# Patient Record
Sex: MALE | Race: WHITE | NOT HISPANIC OR LATINO | URBAN - METROPOLITAN AREA
[De-identification: names, ages, dates, MRNs, and addresses within clinical notes are randomized per-mention and may not be internally consistent; named-entity substitution may affect disease eponyms.]

---

## 2019-03-18 ENCOUNTER — HISTORICAL (OUTPATIENT)
Dept: ADMINISTRATIVE | Facility: HOSPITAL | Age: 29
End: 2019-03-18

## 2019-03-20 LAB — FINAL CULTURE: NO GROWTH

## 2022-04-07 ENCOUNTER — HISTORICAL (OUTPATIENT)
Dept: ADMINISTRATIVE | Facility: HOSPITAL | Age: 32
End: 2022-04-07

## 2022-04-24 VITALS
WEIGHT: 170 LBS | DIASTOLIC BLOOD PRESSURE: 71 MMHG | SYSTOLIC BLOOD PRESSURE: 105 MMHG | BODY MASS INDEX: 23.8 KG/M2 | HEIGHT: 71 IN

## 2022-05-02 NOTE — HISTORICAL [facility] CERNER
This is a historical note converted from [employer]. Formatting and pictures may have been removed.  Please reference [employer] for original formatting and attached multimedia. Chief Complaint  Patient is c/o groin pain on the left side x1 week  History of Present Illness  Pt is a 28-year-old male, here today for?left groin pain x 1 week. Pt denies any recent trauma or heavy lifting. States he has had dysuria today and has had it off and on. ?Patient states?that pain is 4/10, states that started in his testicles and travel up his groin, states that sometimes he has pain?in his side.? Denies fever chills, body aches, flank pain, STDs. ?Patient states that he has been monogamous, with the same partner for years.??Patient states that he has never had STD testing, declined STD testing today.  Review of Systems  Constitutional: negative except as stated in HPI  Eye: negative except as stated in HPI  ENT: negative except as stated in HPI  Respiratory: negative except as stated in HPI  Cardiovascular: negative except as stated in HPI  Gastrointestinal: negative except as stated in HPI  Genitourinary: negative except as stated in HPI  Hema/Lymph: negative except as stated in HPI  Endocrine: negative except as stated in HPI  Immunologic: negative except as stated in HPI  Musculoskeletal: negative except as stated in HPI  Integumentary: negative except as stated in HPI  Neurologic: negative except as stated in HPI  All Other ROS_ negative except as stated in HPI  ?  ?  Physical Exam  Vitals & Measurements  T:?36.7? ?C (Oral)? HR:?73(Peripheral)? BP:?105/71?  HT:?180?cm? WT:?77.11?kg? BMI:?23.8?  General: Alert and oriented, No acute distress.  Eye:? ?Extraocular movements are intact.  HENT: Normocephalic.?  Respiratory: Lungs are clear to auscultation, Respirations are non-labored, Breath sounds are equal, Symmetrical chest wall expansion.  Cardiovascular: Normal rate, Regular rhythm, No murmur.  Gastrointestinal: Soft, Non-tender,  Non-distended, Normal bowel sounds.  Genitourinary: No flank pain. ?No testicular swelling, erythema noted.? No hernia palpated  Musculoskeletal: Normal range of motion.  Integumentary: Warm, Dry, Intact.  Neurologic: No focal deficits, Cranial Nerves II-XII are grossly intact.  ?  Assessment/Plan  1.?Dysuria?R30.0  POC UA + blood. Will send to lab for UA C&S. ?Maintain adequate hydration. ?Medication as ordered. If no improvement in symptoms in 2-3 days, rtc. ER precautions.  Ordered:  ciprofloxacin, 500 mg = 1 tab(s), Oral, q12hr, X 7 day(s), # 14 tab(s), 0 Refill(s), Pharmacy: [facility] [id number]  After Hrs Visit [id number] PC, Dysuria  UTI symptoms  Groin pain, 03/18/19 17:52:00 CDT  Office/Outpatient Visit Level 3 New [id number] PC, Dysuria  UTI symptoms  Groin pain, 03/18/19 17:52:00 CDT  Urinalysis with Microscopic if Indicated, Stat collect, Urine, 03/18/19 17:52:00 CDT, Stop date 03/18/19 17:52:00 CDT, Nurse collect, UTI symptoms  Dysuria  Groin pain  Urine Culture [id number], Stat collect, 03/18/19 17:52:00 CDT, Urine, Nurse collect, Stop date 03/18/19 17:52:00 CDT, UTI symptoms  Dysuria  ?  2.?UTI symptoms?R39.9  ?same as above.  Ordered:  ciprofloxacin, 500 mg = 1 tab(s), Oral, q12hr, X 7 day(s), # 14 tab(s), 0 Refill(s), Pharmacy: [facility] [id number]  After Hrs Visit [id number] PC, Dysuria  UTI symptoms  Groin pain, 03/18/19 17:52:00 CDT  Office/Outpatient Visit Level 3 New [id number] PC, Dysuria  UTI symptoms  Groin pain, 03/18/19 17:52:00 CDT  Urinalysis with Microscopic if Indicated, Stat collect, Urine, 03/18/19 17:52:00 CDT, Stop date 03/18/19 17:52:00 CDT, Nurse collect, UTI symptoms  Dysuria  Groin pain  Urine Culture [id number], Stat collect, 03/18/19 17:52:00 CDT, Urine, Nurse collect, Stop date 03/18/19 17:52:00 CDT, UTI symptoms  Dysuria  ?  3.?Groin pain?R10.30  ?May take otc acetaminophen prn as directed for symptom relief. same as above.  Ordered:  ciprofloxacin, 500 mg = 1 tab(s), Oral,  q12hr, X 7 day(s), # 14 tab(s), 0 Refill(s), Pharmacy: [id number]  After Hrs Visit [id number] PC, Dysuria  UTI symptoms  Groin pain, 03/18/19 17:52:00 CDT  Office/Outpatient Visit Level 3 New [id number] PC, Dysuria  UTI symptoms  Groin pain, 03/18/19 17:52:00 CDT  Urinalysis with Microscopic if Indicated, Stat collect, Urine, 03/18/19 17:52:00 CDT, Stop date 03/18/19 17:52:00 CDT, Nurse collect, UTI symptoms  Dysuria  Groin pain  ?  STD (male)?A64  ?   Problem List/Past Medical History  Ongoing  Add  Gastritis  Historical  No qualifying data  Medications  Cipro 500 mg oral tablet, 500 mg= 1 tab(s), Oral, q12hr  Allergies  No Known Allergies  Social History  Tobacco  4 or less cigarettes(less than 1/4 pack)/day in last 30 days, No, 03/18/2019  Health Maintenance  Health Maintenance  ???Pending?(in the next year)  ??? ??Due?  ??? ? ? ?ADL Screening due??03/18/19??and every 1??year(s)  ??? ? ? ?Alcohol Misuse Screening due??03/18/19??and every 1??year(s)  ??? ? ? ?Tetanus Vaccine due??03/18/19??and every 10??year(s)  ???Satisfied?(in the past 1 year)  ??? ??Satisfied?  ??? ? ? ?Blood Pressure Screening on??03/18/19.??Satisfied by Thierry MA, Clara  ??? ? ? ?Body Mass Index Check on??03/18/19.??Satisfied by Thierry MA, Clara  ??? ? ? ?Depression Screening on??03/18/19.??Satisfied by Thierry MA, Clara  ??? ? ? ?Influenza Vaccine on??03/18/19.??Satisfied by Thierry MA, Clara  ??? ? ? ?Obesity Screening on??03/18/19.??Satisfied by Thierry MA Clara  ?  ?  Diagnostic Results  Urinalysis????????  Color Ur Dipstick????Yellow????  Appearance Ur Dipstick????Clear????  pH Ur Dipstick????7.5????  Specific Gravity Ur Dipstick????1.015????  Blood Ur Dipstick????Small????  Glucose Ur Dipstick????Negative????  Ketones Ur Dipstick????Negative????  Protein Ur Dipstick????Negative????  Bilirubin Ur Dipstick????Negative????  Urobilinogen Ur Dipstick????0.2 mg/dl????  Leukocytes Ur Dipstick????Negative????  Nitrite Ur  Dipstick????Negative????  ?

## 2023-03-22 ENCOUNTER — OFFICE VISIT (OUTPATIENT)
Dept: FAMILY MEDICINE | Facility: CLINIC | Age: 33
End: 2023-03-22
Payer: MEDICAID

## 2023-03-22 VITALS
HEART RATE: 65 BPM | RESPIRATION RATE: 18 BRPM | TEMPERATURE: 98 F | DIASTOLIC BLOOD PRESSURE: 75 MMHG | WEIGHT: 140.69 LBS | HEIGHT: 70 IN | BODY MASS INDEX: 20.14 KG/M2 | SYSTOLIC BLOOD PRESSURE: 112 MMHG | OXYGEN SATURATION: 98 %

## 2023-03-22 DIAGNOSIS — Z00.00 ENCOUNTER FOR WELLNESS EXAMINATION: Primary | ICD-10-CM

## 2023-03-22 DIAGNOSIS — L70.9 ACNE, UNSPECIFIED ACNE TYPE: ICD-10-CM

## 2023-03-22 DIAGNOSIS — Z72.0 TOBACCO ABUSE: ICD-10-CM

## 2023-03-22 DIAGNOSIS — R41.840 INATTENTION: ICD-10-CM

## 2023-03-22 LAB
ALBUMIN SERPL-MCNC: 4.2 G/DL (ref 3.5–5)
ALBUMIN/GLOB SERPL: 1.8 RATIO (ref 1.1–2)
ALP SERPL-CCNC: 51 UNIT/L (ref 40–150)
ALT SERPL-CCNC: 19 UNIT/L (ref 0–55)
APPEARANCE UR: CLEAR
AST SERPL-CCNC: 19 UNIT/L (ref 5–34)
BACTERIA #/AREA URNS AUTO: ABNORMAL /HPF
BASOPHILS # BLD AUTO: 0.05 X10(3)/MCL (ref 0–0.2)
BASOPHILS NFR BLD AUTO: 0.8 %
BILIRUB UR QL STRIP.AUTO: NEGATIVE MG/DL
BILIRUBIN DIRECT+TOT PNL SERPL-MCNC: 0.7 MG/DL
BUN SERPL-MCNC: 11.9 MG/DL (ref 8.9–20.6)
CALCIUM SERPL-MCNC: 9.8 MG/DL (ref 8.4–10.2)
CHLORIDE SERPL-SCNC: 105 MMOL/L (ref 98–107)
CHOLEST SERPL-MCNC: 156 MG/DL
CHOLEST/HDLC SERPL: 5 {RATIO} (ref 0–5)
CO2 SERPL-SCNC: 30 MMOL/L (ref 22–29)
COLOR UR AUTO: ABNORMAL
CREAT SERPL-MCNC: 0.86 MG/DL (ref 0.73–1.18)
EOSINOPHIL # BLD AUTO: 0.18 X10(3)/MCL (ref 0–0.9)
EOSINOPHIL NFR BLD AUTO: 3 %
ERYTHROCYTE [DISTWIDTH] IN BLOOD BY AUTOMATED COUNT: 11.9 % (ref 11.5–17)
EST. AVERAGE GLUCOSE BLD GHB EST-MCNC: 96.8 MG/DL
GFR SERPLBLD CREATININE-BSD FMLA CKD-EPI: >60 MLS/MIN/1.73/M2
GLOBULIN SER-MCNC: 2.4 GM/DL (ref 2.4–3.5)
GLUCOSE SERPL-MCNC: 86 MG/DL (ref 74–100)
GLUCOSE UR QL STRIP.AUTO: NORMAL MG/DL
HAV IGM SERPL QL IA: NONREACTIVE
HBA1C MFR BLD: 5 %
HBV CORE IGM SERPL QL IA: NONREACTIVE
HBV SURFACE AG SERPL QL IA: NONREACTIVE
HCT VFR BLD AUTO: 45.1 % (ref 42–52)
HCV AB SERPL QL IA: NONREACTIVE
HDLC SERPL-MCNC: 34 MG/DL (ref 35–60)
HGB BLD-MCNC: 15.3 G/DL (ref 14–18)
HIV 1+2 AB+HIV1 P24 AG SERPL QL IA: NONREACTIVE
HYALINE CASTS #/AREA URNS LPF: ABNORMAL /LPF
IMM GRANULOCYTES # BLD AUTO: 0 X10(3)/MCL (ref 0–0.04)
IMM GRANULOCYTES NFR BLD AUTO: 0 %
KETONES UR QL STRIP.AUTO: NEGATIVE MG/DL
LDLC SERPL CALC-MCNC: 106 MG/DL (ref 50–140)
LEUKOCYTE ESTERASE UR QL STRIP.AUTO: NEGATIVE UNIT/L
LYMPHOCYTES # BLD AUTO: 2.18 X10(3)/MCL (ref 0.6–4.6)
LYMPHOCYTES NFR BLD AUTO: 36.9 %
MCH RBC QN AUTO: 30.5 PG
MCHC RBC AUTO-ENTMCNC: 33.9 G/DL (ref 33–36)
MCV RBC AUTO: 89.8 FL (ref 80–94)
MONOCYTES # BLD AUTO: 0.51 X10(3)/MCL (ref 0.1–1.3)
MONOCYTES NFR BLD AUTO: 8.6 %
MUCOUS THREADS URNS QL MICRO: ABNORMAL /LPF
NEUTROPHILS # BLD AUTO: 2.99 X10(3)/MCL (ref 2.1–9.2)
NEUTROPHILS NFR BLD AUTO: 50.7 %
NITRITE UR QL STRIP.AUTO: NEGATIVE
NRBC BLD AUTO-RTO: 0 %
PATH REV: NORMAL
PH UR STRIP.AUTO: 6 [PH]
PLATELET # BLD AUTO: 175 X10(3)/MCL (ref 130–400)
PMV BLD AUTO: 10.3 FL (ref 7.4–10.4)
POTASSIUM SERPL-SCNC: 4 MMOL/L (ref 3.5–5.1)
PROT SERPL-MCNC: 6.6 GM/DL (ref 6.4–8.3)
PROT UR QL STRIP.AUTO: NEGATIVE MG/DL
RBC # BLD AUTO: 5.02 X10(6)/MCL (ref 4.7–6.1)
RBC #/AREA URNS AUTO: ABNORMAL /HPF
RBC UR QL AUTO: ABNORMAL UNIT/L
SODIUM SERPL-SCNC: 140 MMOL/L (ref 136–145)
SP GR UR STRIP.AUTO: 1.02
SQUAMOUS #/AREA URNS LPF: ABNORMAL /HPF
T PALLIDUM AB SER QL: NONREACTIVE
T4 FREE SERPL-MCNC: 1.21 NG/DL (ref 0.7–1.48)
TRIGL SERPL-MCNC: 79 MG/DL (ref 34–140)
TSH SERPL-ACNC: 2.05 UIU/ML (ref 0.35–4.94)
UROBILINOGEN UR STRIP-ACNC: NORMAL MG/DL
VLDLC SERPL CALC-MCNC: 16 MG/DL
WBC # SPEC AUTO: 5.9 X10(3)/MCL (ref 4.5–11.5)
WBC #/AREA URNS AUTO: ABNORMAL /HPF

## 2023-03-22 PROCEDURE — 3078F PR MOST RECENT DIASTOLIC BLOOD PRESSURE < 80 MM HG: ICD-10-PCS | Mod: CPTII,,,

## 2023-03-22 PROCEDURE — 81001 URINALYSIS AUTO W/SCOPE: CPT

## 2023-03-22 PROCEDURE — 1160F PR REVIEW ALL MEDS BY PRESCRIBER/CLIN PHARMACIST DOCUMENTED: ICD-10-PCS | Mod: CPTII,,,

## 2023-03-22 PROCEDURE — 36415 COLL VENOUS BLD VENIPUNCTURE: CPT

## 2023-03-22 PROCEDURE — 80061 LIPID PANEL: CPT

## 2023-03-22 PROCEDURE — 84439 ASSAY OF FREE THYROXINE: CPT

## 2023-03-22 PROCEDURE — 1159F PR MEDICATION LIST DOCUMENTED IN MEDICAL RECORD: ICD-10-PCS | Mod: CPTII,,,

## 2023-03-22 PROCEDURE — 80074 ACUTE HEPATITIS PANEL: CPT

## 2023-03-22 PROCEDURE — 84443 ASSAY THYROID STIM HORMONE: CPT

## 2023-03-22 PROCEDURE — 99214 OFFICE O/P EST MOD 30 MIN: CPT | Mod: PBBFAC,PN

## 2023-03-22 PROCEDURE — 99406 BEHAV CHNG SMOKING 3-10 MIN: CPT | Mod: S$PBB,,,

## 2023-03-22 PROCEDURE — 85025 COMPLETE CBC W/AUTO DIFF WBC: CPT

## 2023-03-22 PROCEDURE — 1159F MED LIST DOCD IN RCRD: CPT | Mod: CPTII,,,

## 2023-03-22 PROCEDURE — 99385 PR PREVENTIVE VISIT,NEW,18-39: ICD-10-PCS | Mod: S$PBB,,,

## 2023-03-22 PROCEDURE — 3074F PR MOST RECENT SYSTOLIC BLOOD PRESSURE < 130 MM HG: ICD-10-PCS | Mod: CPTII,,,

## 2023-03-22 PROCEDURE — 1160F RVW MEDS BY RX/DR IN RCRD: CPT | Mod: CPTII,,,

## 2023-03-22 PROCEDURE — 99406 PR TOBACCO USE CESSATION INTERMEDIATE 3-10 MINUTES: ICD-10-PCS | Mod: S$PBB,,,

## 2023-03-22 PROCEDURE — 99385 PREV VISIT NEW AGE 18-39: CPT | Mod: S$PBB,,,

## 2023-03-22 PROCEDURE — 3074F SYST BP LT 130 MM HG: CPT | Mod: CPTII,,,

## 2023-03-22 PROCEDURE — 83036 HEMOGLOBIN GLYCOSYLATED A1C: CPT

## 2023-03-22 PROCEDURE — 87389 HIV-1 AG W/HIV-1&-2 AB AG IA: CPT

## 2023-03-22 PROCEDURE — 3008F BODY MASS INDEX DOCD: CPT | Mod: CPTII,,,

## 2023-03-22 PROCEDURE — 80053 COMPREHEN METABOLIC PANEL: CPT

## 2023-03-22 PROCEDURE — 3078F DIAST BP <80 MM HG: CPT | Mod: CPTII,,,

## 2023-03-22 PROCEDURE — 86780 TREPONEMA PALLIDUM: CPT

## 2023-03-22 PROCEDURE — 3008F PR BODY MASS INDEX (BMI) DOCUMENTED: ICD-10-PCS | Mod: CPTII,,,

## 2023-03-22 RX ORDER — DOXYCYCLINE 100 MG/1
100 CAPSULE ORAL DAILY
Qty: 30 CAPSULE | Refills: 2 | Status: SHIPPED | OUTPATIENT
Start: 2023-03-22 | End: 2023-06-20

## 2023-03-22 NOTE — PROGRESS NOTES
Patient Name: Ned Ramos   : 1990  MRN: [id number]     Subjective:   Patient ID: Ned Ramos is a 32 y.o. male.    Chief Complaint:   Chief Complaint   Patient presents with    Establish Care     C/O ingrown hairs, refer to Behavioral Health         HPI: 2023:  Patient presents to clinic today to establish care, never really had formal primary care provider previously but lives in the area and decided to come in establish care.  His main complaint is recurring ingrown hairs as well as back acne, patient states that the ingrown hairs occur even when he is not shaving his face or head.  Multiple open and closed comedones scattered across back, face, hairline.  Additionally patient is requesting referral to Psychiatry to reassess need for medication to assist with inattention.  Patient states when he was younger he was on Ritalin but stopped taking it due to no longer being in school, now notices inattention causing difficulty in day-to-day life.  No additional acute complaints from patient today, denies any personal or family history of cancers, states that mother and father have scattered normal health diseases such as diabetes and hypertension.      ROS:  Review of Systems   Constitutional:  Negative for chills, fever and weight loss.   HENT:  Negative for ear discharge, nosebleeds and tinnitus.    Eyes:  Negative for blurred vision, photophobia and pain.   Respiratory:  Negative for cough, shortness of breath, wheezing and stridor.    Cardiovascular:  Negative for chest pain, palpitations and orthopnea.   Gastrointestinal:  Negative for abdominal pain, heartburn and nausea.   Genitourinary:  Negative for dysuria, frequency, hematuria and urgency.   Musculoskeletal:  Negative for falls and myalgias.   Skin:  Negative for itching and rash.        Acne   Neurological:  Negative for dizziness, sensory change, speech change, focal weakness, seizures, weakness and headaches.  "  Endo/Heme/Allergies:  Negative for environmental allergies. Does not bruise/bleed easily.   Psychiatric/Behavioral:  Negative for hallucinations and suicidal ideas.         Inattention     History:     Past Medical History:   Diagnosis Date    ADHD (attention deficit hyperactivity disorder)       History reviewed. No pertinent surgical history.  History reviewed. No pertinent family history.   Social History     Tobacco Use    Smoking status: Never    Smokeless tobacco: Current   Substance and Sexual Activity    Alcohol use: Not Currently    Drug use: Not Currently     Types: Marijuana    Sexual activity: Not on file        Allergies:   Review of patient's allergies indicates:   Allergen Reactions    Latex Rash     Objective:     Vitals:    03/22/23 0749   BP: 112/75   Pulse: 65   Resp: 18   Temp: 98.1 °F (36.7 °C)   SpO2: 98%   Weight: 63.8 kg (140 lb 11.2 oz)   Height: 5' 10" (1.778 m)     Body mass index is 20.19 kg/m².     Physical Examination:   Physical Exam  Vitals reviewed.   Constitutional:       Appearance: Normal appearance. He is normal weight.   HENT:      Head: Normocephalic.      Right Ear: Tympanic membrane, ear canal and external ear normal.      Left Ear: Tympanic membrane, ear canal and external ear normal.      Nose: Nose normal.      Mouth/Throat:      Mouth: Mucous membranes are moist.      Pharynx: Oropharynx is clear.   Eyes:      Extraocular Movements: Extraocular movements intact.      Conjunctiva/sclera: Conjunctivae normal.      Pupils: Pupils are equal, round, and reactive to light.   Cardiovascular:      Rate and Rhythm: Normal rate and regular rhythm.      Pulses: Normal pulses.      Heart sounds: Normal heart sounds.   Pulmonary:      Effort: Pulmonary effort is normal.      Breath sounds: Normal breath sounds.   Abdominal:      General: Abdomen is flat. Bowel sounds are normal.      Palpations: Abdomen is soft.   Musculoskeletal:         General: Normal range of motion.      " Cervical back: Normal range of motion and neck supple.   Skin:     General: Skin is warm and dry.      Findings: Acne present.   Neurological:      General: No focal deficit present.      Mental Status: He is alert and oriented to person, place, and time.   Psychiatric:         Mood and Affect: Mood normal.         Behavior: Behavior normal.       Assessment:     Problem List Items Addressed This Visit          Derm    Acne    Relevant Medications    doxycycline (VIBRAMYCIN) 100 MG Cap       Other    Tobacco abuse (Chronic)    Current Assessment & Plan     Encouraged  smoking cessation.  Smoking cessation discussed for 5 minutes.  Discussed benefits of quitting including improved health, decreased cardiac/vascular/pulmonary/stroke risks as well as saving money            Other Visit Diagnoses       Encounter for wellness examination    -  Primary    Relevant Orders    TSH    T4, Free    Hemoglobin A1C    SYPHILIS ANTIBODY (WITH REFLEX RPR)    Hepatitis Panel, Acute    Lipid Panel    CBC Auto Differential    Comprehensive Metabolic Panel    HIV 1/2 Ag/Ab (4th Gen)    Urinalysis, Reflex to Urine Culture    Folate    Vitamin B12    Vitamin D    Inattention        Referral to psych for formal assessment and evaluation    Relevant Orders    Ambulatory referral/consult to Behavioral Health            Plan:   Ned was seen today for establish care.    Diagnoses and all orders for this visit:    Encounter for wellness examination  -     TSH  -     T4, Free  -     Hemoglobin A1C  -     SYPHILIS ANTIBODY (WITH REFLEX RPR)  -     Hepatitis Panel, Acute  -     Lipid Panel  -     CBC Auto Differential  -     Comprehensive Metabolic Panel  -     HIV 1/2 Ag/Ab (4th Gen)  -     Urinalysis, Reflex to Urine Culture  -     Folate  -     Vitamin B12  -     Vitamin D    Inattention  Comments:  Referral to psych for formal assessment and evaluation  Orders:  -     Ambulatory referral/consult to Behavioral Health; Future    Acne,  unspecified acne type  Comments:  Trial doxycycline 100 mg daily due to widespread acne, will add topicals if no resolution  Orders:  -     doxycycline (VIBRAMYCIN) 100 MG Cap; Take 1 capsule (100 mg total) by mouth once daily. for 90 doses    Tobacco abuse       Follow up in about 1 month (around 4/25/2023) for review labs, psych follow up.     This note was created with the assistance of Dragon voice recognition software or phone dictation. There may be transcription errors as a result of using this technology however minimal. Effort has been made to assure accuracy of transcription but any obvious errors or omissions should be clarified with the author of the document

## 2023-04-24 ENCOUNTER — OFFICE VISIT (OUTPATIENT)
Dept: BEHAVIORAL HEALTH | Facility: CLINIC | Age: 33
End: 2023-04-24
Payer: MEDICAID

## 2023-04-24 VITALS
HEIGHT: 70 IN | SYSTOLIC BLOOD PRESSURE: 118 MMHG | DIASTOLIC BLOOD PRESSURE: 76 MMHG | HEART RATE: 68 BPM | BODY MASS INDEX: 22.19 KG/M2 | TEMPERATURE: 99 F | WEIGHT: 155 LBS

## 2023-04-24 DIAGNOSIS — F90.2 ATTENTION DEFICIT HYPERACTIVITY DISORDER (ADHD), COMBINED TYPE: Chronic | ICD-10-CM

## 2023-04-24 PROCEDURE — 3008F BODY MASS INDEX DOCD: CPT | Mod: CPTII,,, | Performed by: NURSE PRACTITIONER

## 2023-04-24 PROCEDURE — 99214 OFFICE O/P EST MOD 30 MIN: CPT | Mod: S$PBB,,, | Performed by: NURSE PRACTITIONER

## 2023-04-24 PROCEDURE — 1159F MED LIST DOCD IN RCRD: CPT | Mod: CPTII,,, | Performed by: NURSE PRACTITIONER

## 2023-04-24 PROCEDURE — 1159F PR MEDICATION LIST DOCUMENTED IN MEDICAL RECORD: ICD-10-PCS | Mod: CPTII,,, | Performed by: NURSE PRACTITIONER

## 2023-04-24 PROCEDURE — 1160F RVW MEDS BY RX/DR IN RCRD: CPT | Mod: CPTII,,, | Performed by: NURSE PRACTITIONER

## 2023-04-24 PROCEDURE — 99213 OFFICE O/P EST LOW 20 MIN: CPT | Mod: PBBFAC,PN | Performed by: NURSE PRACTITIONER

## 2023-04-24 PROCEDURE — 3008F PR BODY MASS INDEX (BMI) DOCUMENTED: ICD-10-PCS | Mod: CPTII,,, | Performed by: NURSE PRACTITIONER

## 2023-04-24 PROCEDURE — 3078F PR MOST RECENT DIASTOLIC BLOOD PRESSURE < 80 MM HG: ICD-10-PCS | Mod: CPTII,,, | Performed by: NURSE PRACTITIONER

## 2023-04-24 PROCEDURE — 3078F DIAST BP <80 MM HG: CPT | Mod: CPTII,,, | Performed by: NURSE PRACTITIONER

## 2023-04-24 PROCEDURE — 99214 PR OFFICE/OUTPT VISIT, EST, LEVL IV, 30-39 MIN: ICD-10-PCS | Mod: S$PBB,,, | Performed by: NURSE PRACTITIONER

## 2023-04-24 PROCEDURE — 3074F SYST BP LT 130 MM HG: CPT | Mod: CPTII,,, | Performed by: NURSE PRACTITIONER

## 2023-04-24 PROCEDURE — 3074F PR MOST RECENT SYSTOLIC BLOOD PRESSURE < 130 MM HG: ICD-10-PCS | Mod: CPTII,,, | Performed by: NURSE PRACTITIONER

## 2023-04-24 PROCEDURE — 1160F PR REVIEW ALL MEDS BY PRESCRIBER/CLIN PHARMACIST DOCUMENTED: ICD-10-PCS | Mod: CPTII,,, | Performed by: NURSE PRACTITIONER

## 2023-04-24 NOTE — PROGRESS NOTES
Initial Interview  04/24/2024  HPI: Ned Ramos is a 32 y.o. male here today for a psychiatric evaluation referred by PCP to the [facility] for   Past Medical History:  ADHD (attention deficit hyperactivity disorder)       Patient explains that he has a history of ADHD. Was on Ritalin/Adderall as a child.   Lost his private insurance and was not able to afford doctors visits for Adderall.   Has not been on Adderall for about 7 years.   Recently got insurance and has re-established care.   a lot of trouble focusing.   Lack of energy altogether and he gets easily overwhelmed; has difficulty managing tasks.     Sleeps well - 6-8 hours a night.  Has an appetite but it is less than most people.   States that he has gastritis/IBS. Marijuana helps with those symptoms.    Adult ADHD screen:  Part A: 19 - highly consistent with ADHD  Part B: 36    He denies being depressed.   States that his main concerns are his inability to concentrate and lethargy.    PCP to prescribe medication to address ADHD.       Medications:   Current Outpatient Medications   Medication Instructions    doxycycline (VIBRAMYCIN) 100 mg, Oral, Daily        Psychiatric History:   Reports a prior psychiatric history: ADHD  History of mental health out-patient treatment:   History of in-patient psychiatric hospitalization: denies  History of suicidal ideations:   History of suicidal threats:   History of suicide attempts: denies  History of self mutilation:   History of psychotropic medications:     Family Psychiatric History:  Mental Illness: family history of ADHD  Alcohol abuse/addiction:   Drug addiction:     Substance Use History:  Alcohol: occasionally  Marijuana: varies  Benzodiazepines: denies  Opiates: denies  Stimulants: denies  Cocaine: denies  Methamphetamine: denies  Nicotine: vapes  Caffeine:    Social History:   Grew up in: [place]  Raised by:  Number of siblings:  Education: dropped out of the 10th grade; GED  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    PHQ Score:   04/24/2024: 8     ARIANE-7 Score:   04/24/2024: 1    Mental Status Evaluation:  Appearance:  unremarkable, age appropriate, casually dressed, neatly groomed   Behavior:  normal, cooperative   Speech:  no latency; no press   Mood:  euthymic   Affect:  congruent and appropriate   Thought Process:  normal and logical   Thought Content:  normal, no suicidality, no homicidality, delusions, or paranoia   Sensorium:  grossly intact   Cognition:  grossly intact   Insight:  intact   Judgment:  behavior is adequate to circumstances     Impression:   ADHD    Plan:  PCP to prescribe ADHD medications  FU as needed

## 2023-04-25 ENCOUNTER — OFFICE VISIT (OUTPATIENT)
Dept: FAMILY MEDICINE | Facility: CLINIC | Age: 33
End: 2023-04-25
Payer: MEDICAID

## 2023-04-25 DIAGNOSIS — F90.2 ATTENTION DEFICIT HYPERACTIVITY DISORDER (ADHD), COMBINED TYPE: Primary | Chronic | ICD-10-CM

## 2023-04-25 PROCEDURE — 1160F PR REVIEW ALL MEDS BY PRESCRIBER/CLIN PHARMACIST DOCUMENTED: ICD-10-PCS | Mod: CPTII,95,,

## 2023-04-25 PROCEDURE — 1160F RVW MEDS BY RX/DR IN RCRD: CPT | Mod: CPTII,95,,

## 2023-04-25 PROCEDURE — 1159F MED LIST DOCD IN RCRD: CPT | Mod: CPTII,95,,

## 2023-04-25 PROCEDURE — 99214 OFFICE O/P EST MOD 30 MIN: CPT | Mod: 95,,,

## 2023-04-25 PROCEDURE — 99214 PR OFFICE/OUTPT VISIT, EST, LEVL IV, 30-39 MIN: ICD-10-PCS | Mod: 95,,,

## 2023-04-25 PROCEDURE — 1159F PR MEDICATION LIST DOCUMENTED IN MEDICAL RECORD: ICD-10-PCS | Mod: CPTII,95,,

## 2023-04-25 RX ORDER — DEXTROAMPHETAMINE SACCHARATE, AMPHETAMINE ASPARTATE, DEXTROAMPHETAMINE SULFATE AND AMPHETAMINE SULFATE 5; 5; 5; 5 MG/1; MG/1; MG/1; MG/1
1 TABLET ORAL DAILY
Qty: 30 TABLET | Refills: 0 | Status: SHIPPED | OUTPATIENT
Start: 2023-04-25 | End: 2023-05-25

## 2023-04-25 RX ORDER — DEXTROAMPHETAMINE SACCHARATE, AMPHETAMINE ASPARTATE, DEXTROAMPHETAMINE SULFATE AND AMPHETAMINE SULFATE 2.5; 2.5; 2.5; 2.5 MG/1; MG/1; MG/1; MG/1
10 TABLET ORAL DAILY
Qty: 30 TABLET | Refills: 0 | Status: CANCELLED | OUTPATIENT
Start: 2023-04-25 | End: 2023-05-25

## 2023-04-25 NOTE — PROGRESS NOTES
Audio/visual Telehealth Visit     The patient location is:  [place]  The chief complaint leading to consultation is: adhd  Visit type:  Synchronous audio visual  Total time spent with patient: 20 min     Each patient to whom I provide medical services by telemedicine is:  (1) informed of the relationship between the physician and patient and the respective role of any other health care provider with respect to management of the patient; and (2) notified that they may decline to receive medical services by telemedicine and may withdraw from such care at any time. Patient verbally consented to receive this service via audio/visual call.      Patient Name: Ned Ramos   : 1990  MRN: [id number]     Subjective:   Patient ID: Ned Ramos is a 32 y.o. male.    Chief Complaint: No chief complaint on file.       HPI: 2023: Patient formally seen and evaluated by Psych NP for ADHD. Previously on adderall and ritalin but has not had any new medications for this issue in about 7 years due to insurance. Patient amenable to trial medications. Reviewed labs with patient today and he states he noticing improvement in his acne. No new or additional complaints from patient today.     2023:  Patient presents to clinic today to establish care, never really had formal primary care provider previously but lives in the area and decided to come in establish care.  His main complaint is recurring ingrown hairs as well as back acne, patient states that the ingrown hairs occur even when he is not shaving his face or head.  Multiple open and closed comedones scattered across back, face, hairline.  Additionally patient is requesting referral to Psychiatry to reassess need for medication to assist with inattention.  Patient states when he was younger he was on Ritalin but stopped taking it due to no longer being in school, now notices inattention causing difficulty in day-to-day life.  No additional acute  complaints from patient today, denies any personal or family history of cancers, states that mother and father have scattered normal health diseases such as diabetes and hypertension.      ROS:  Review of Systems   Constitutional:  Negative for chills, fever and weight loss.   HENT:  Negative for ear discharge, nosebleeds and tinnitus.    Eyes:  Negative for blurred vision, photophobia and pain.   Respiratory:  Negative for cough, shortness of breath, wheezing and stridor.    Cardiovascular:  Negative for chest pain, palpitations and orthopnea.   Gastrointestinal:  Negative for abdominal pain, heartburn and nausea.   Genitourinary:  Negative for dysuria, frequency, hematuria and urgency.   Musculoskeletal:  Negative for falls and myalgias.   Skin:  Negative for itching and rash.   Neurological:  Negative for dizziness, sensory change, speech change, focal weakness, seizures, weakness and headaches.   Endo/Heme/Allergies:  Negative for environmental allergies. Does not bruise/bleed easily.   Psychiatric/Behavioral:  Negative for hallucinations and suicidal ideas.         Inattention     History:     Past Medical History:   Diagnosis Date    ADHD (attention deficit hyperactivity disorder)       No past surgical history on file.  No family history on file.   Social History     Tobacco Use    Smoking status: Never    Smokeless tobacco: Current   Substance and Sexual Activity    Alcohol use: Not Currently    Drug use: Not Currently     Types: Marijuana    Sexual activity: Not on file        Allergies:   Review of patient's allergies indicates:   Allergen Reactions    Latex Rash     Objective:   There were no vitals filed for this visit.  There is no height or weight on file to calculate BMI.     Physical Examination:   Physical Exam  Constitutional:       General: He is not in acute distress.     Comments: Limited Physical Exam due to telemedicine visit   Pulmonary:      Effort: Pulmonary effort is normal. No respiratory  distress.   Neurological:      Mental Status: He is alert.   Psychiatric:         Mood and Affect: Mood normal.         Behavior: Behavior normal.       Assessment:     Problem List Items Addressed This Visit          Psychiatric    Attention deficit hyperactivity disorder (ADHD), combined type - Primary (Chronic)    Current Assessment & Plan     Start adderall 20mg daily, Started on medication at office visit today, medication side effects, risks and goals discussed with patient. All questions asked  and answered.  Patient given enough medication, with refills as necessary, to last until patient's follow-up visit.     UDS prior to visit           Relevant Medications    dextroamphetamine-amphetamine (ADDERALL) 20 mg tablet    Other Relevant Orders    Drug Screen, Urine       Plan:   Diagnoses and all orders for this visit:    Attention deficit hyperactivity disorder (ADHD), combined type  -     dextroamphetamine-amphetamine (ADDERALL) 20 mg tablet; Take 1 tablet by mouth once daily.  -     Drug Screen, Urine; Future    Other orders  The following orders have not been finalized:  -     Cancel: dextroamphetamine-amphetamine (ADDERALL) 10 mg Tab       Follow up in about 4 weeks (around 5/23/2023) for med change fu, drug screen if not completed prior.       This note was created with the assistance of a voice recognition software or phone dictation. There may be transcription errors as a result of using this technology however minimal. Effort has been made to assure accuracy of transcription but any obvious errors or omissions should be clarified with the author of the document      This service was not originating from a related E/M service provided within the previous 7 days nor will  to an E/M service or procedure within the next 24 hours or my soonest available appointment.  Prevailing standard of care was able to be met in this audio-visual visit.

## 2023-04-25 NOTE — ASSESSMENT & PLAN NOTE
Start adderall 20mg daily, Started on medication at office visit today, medication side effects, risks and goals discussed with patient. All questions asked  and answered.  Patient given enough medication, with refills as necessary, to last until patient's follow-up visit.     UDS prior to visit